# Patient Record
Sex: FEMALE | Race: WHITE | URBAN - METROPOLITAN AREA
[De-identification: names, ages, dates, MRNs, and addresses within clinical notes are randomized per-mention and may not be internally consistent; named-entity substitution may affect disease eponyms.]

---

## 2017-01-24 ENCOUNTER — APPOINTMENT (RX ONLY)
Dept: URBAN - METROPOLITAN AREA CLINIC 52 | Facility: CLINIC | Age: 77
Setting detail: DERMATOLOGY
End: 2017-01-24

## 2017-01-24 DIAGNOSIS — L80 VITILIGO: ICD-10-CM

## 2017-01-24 DIAGNOSIS — D22 MELANOCYTIC NEVI: ICD-10-CM

## 2017-01-24 DIAGNOSIS — L82.1 OTHER SEBORRHEIC KERATOSIS: ICD-10-CM

## 2017-01-24 DIAGNOSIS — B00.1 HERPESVIRAL VESICULAR DERMATITIS: ICD-10-CM | Status: RESOLVED

## 2017-01-24 DIAGNOSIS — D18.0 HEMANGIOMA: ICD-10-CM

## 2017-01-24 DIAGNOSIS — L57.8 OTHER SKIN CHANGES DUE TO CHRONIC EXPOSURE TO NONIONIZING RADIATION: ICD-10-CM

## 2017-01-24 PROBLEM — D22.9 MELANOCYTIC NEVI, UNSPECIFIED: Status: ACTIVE | Noted: 2017-01-24

## 2017-01-24 PROBLEM — D18.01 HEMANGIOMA OF SKIN AND SUBCUTANEOUS TISSUE: Status: ACTIVE | Noted: 2017-01-24

## 2017-01-24 PROCEDURE — 99214 OFFICE O/P EST MOD 30 MIN: CPT

## 2017-01-24 PROCEDURE — ? PRESCRIPTION

## 2017-01-24 PROCEDURE — ? COUNSELING

## 2017-01-24 RX ORDER — TACROLIMUS 1 MG/G
OINTMENT TOPICAL BID
Qty: 1 | Refills: 3 | Status: ERX | COMMUNITY
Start: 2017-01-24

## 2017-01-24 RX ADMIN — TACROLIMUS: 1 OINTMENT TOPICAL at 00:00

## 2017-01-24 ASSESSMENT — LOCATION ZONE DERM: LOCATION ZONE: FACE

## 2017-01-24 ASSESSMENT — LOCATION DETAILED DESCRIPTION DERM: LOCATION DETAILED: RIGHT CHIN

## 2017-01-24 ASSESSMENT — LOCATION SIMPLE DESCRIPTION DERM: LOCATION SIMPLE: CHIN

## 2017-01-24 NOTE — PROCEDURE: MIPS QUALITY
Quality 265: Biopsy Follow-Up: Biopsy results reviewed, communicated, tracked, and documented
Detail Level: Detailed
Quality 131: Pain Assessment And Follow-Up: Pain assessment using a standardized tool is documented as negative, no follow-up plan required
Quality 226: Preventive Care And Screening: Tobacco Use: Screening And Cessation Intervention: Patient screened for tobacco and never smoked
Quality 47: Advance Care Plan: Advance Care Planning discussed and documented in the medical record; patient did not wish or was not able to name a surrogate decision maker or provide an advance care plan.
Quality 110: Preventive Care And Screening: Influenza Immunization: Influenza Immunization previously received during influenza season
Quality 130: Documentation Of Current Medications In The Medical Record: Current Medications Documented
Quality 111:Pneumonia Vaccination Status For Older Adults: Pneumococcal Vaccination Previously Received

## 2017-06-20 ENCOUNTER — APPOINTMENT (RX ONLY)
Dept: URBAN - METROPOLITAN AREA CLINIC 52 | Facility: CLINIC | Age: 77
Setting detail: DERMATOLOGY
End: 2017-06-20

## 2017-06-20 DIAGNOSIS — D22 MELANOCYTIC NEVI: ICD-10-CM

## 2017-06-20 DIAGNOSIS — L57.8 OTHER SKIN CHANGES DUE TO CHRONIC EXPOSURE TO NONIONIZING RADIATION: ICD-10-CM

## 2017-06-20 DIAGNOSIS — L80 VITILIGO: ICD-10-CM | Status: UNCHANGED

## 2017-06-20 DIAGNOSIS — D18.0 HEMANGIOMA: ICD-10-CM

## 2017-06-20 DIAGNOSIS — L82.1 OTHER SEBORRHEIC KERATOSIS: ICD-10-CM

## 2017-06-20 DIAGNOSIS — Z85.828 PERSONAL HISTORY OF OTHER MALIGNANT NEOPLASM OF SKIN: ICD-10-CM

## 2017-06-20 PROBLEM — D22.9 MELANOCYTIC NEVI, UNSPECIFIED: Status: ACTIVE | Noted: 2017-06-20

## 2017-06-20 PROBLEM — D18.01 HEMANGIOMA OF SKIN AND SUBCUTANEOUS TISSUE: Status: ACTIVE | Noted: 2017-06-20

## 2017-06-20 PROCEDURE — ? COUNSELING

## 2017-06-20 PROCEDURE — ? TREATMENT REGIMEN

## 2017-06-20 PROCEDURE — 99214 OFFICE O/P EST MOD 30 MIN: CPT

## 2017-06-20 ASSESSMENT — LOCATION DETAILED DESCRIPTION DERM
LOCATION DETAILED: LEFT SUPERIOR FOREHEAD
LOCATION DETAILED: RIGHT LATERAL DORSAL FOOT

## 2017-06-20 ASSESSMENT — LOCATION ZONE DERM
LOCATION ZONE: FACE
LOCATION ZONE: FEET

## 2017-06-20 ASSESSMENT — LOCATION SIMPLE DESCRIPTION DERM
LOCATION SIMPLE: RIGHT FOOT
LOCATION SIMPLE: LEFT FOREHEAD

## 2017-07-13 ENCOUNTER — APPOINTMENT (RX ONLY)
Dept: URBAN - METROPOLITAN AREA CLINIC 52 | Facility: CLINIC | Age: 77
Setting detail: DERMATOLOGY
End: 2017-07-13

## 2017-07-13 DIAGNOSIS — I78.8 OTHER DISEASES OF CAPILLARIES: ICD-10-CM

## 2017-07-13 DIAGNOSIS — D18.0 HEMANGIOMA: ICD-10-CM

## 2017-07-13 PROBLEM — D18.01 HEMANGIOMA OF SKIN AND SUBCUTANEOUS TISSUE: Status: ACTIVE | Noted: 2017-07-13

## 2017-07-13 PROCEDURE — ? COSMETIC CONSULTATION - RED SPOTS

## 2017-07-13 PROCEDURE — ? MEDICAL CONSULTATION: RED SPOTS

## 2017-07-13 ASSESSMENT — LOCATION DETAILED DESCRIPTION DERM
LOCATION DETAILED: LEFT MEDIAL MALAR CHEEK
LOCATION DETAILED: LEFT LATERAL FOREHEAD
LOCATION DETAILED: LEFT CHIN
LOCATION DETAILED: RIGHT FOREHEAD
LOCATION DETAILED: LEFT UPPER CUTANEOUS LIP
LOCATION DETAILED: RIGHT NASAL ALA

## 2017-07-13 ASSESSMENT — LOCATION SIMPLE DESCRIPTION DERM
LOCATION SIMPLE: RIGHT FOREHEAD
LOCATION SIMPLE: CHIN
LOCATION SIMPLE: LEFT CHEEK
LOCATION SIMPLE: RIGHT NOSE
LOCATION SIMPLE: LEFT FOREHEAD
LOCATION SIMPLE: LEFT LIP

## 2017-07-13 ASSESSMENT — LOCATION ZONE DERM
LOCATION ZONE: NOSE
LOCATION ZONE: LIP
LOCATION ZONE: FACE

## 2017-09-21 NOTE — PATIENT DISCUSSION
(H35.371) Puckering of macula, right eye - Assesment : Examination revealed ERM-TRACE - Plan : Monitor. Advised patient to call our office with decreased vision or increased symptoms.

## 2017-09-21 NOTE — PATIENT DISCUSSION
(D96.820) Keratoconjunct sicca, not specified as Sjogren's, bilateral - Assesment : Examination revealed Dry Eye Syndrome - Plan : Monitor for changes. Advised patient to call our office with decreased vision or increased symptoms.   Artificial tears 3-4 times a day 1 year Exam/Octm

## 2018-07-12 ENCOUNTER — NEW PATIENT COMPREHENSIVE (OUTPATIENT)
Dept: URBAN - METROPOLITAN AREA CLINIC 39 | Facility: CLINIC | Age: 78
End: 2018-07-12

## 2018-07-12 DIAGNOSIS — Z96.1: ICD-10-CM

## 2018-07-12 DIAGNOSIS — H52.03: ICD-10-CM

## 2018-07-12 DIAGNOSIS — H52.4: ICD-10-CM

## 2018-07-12 DIAGNOSIS — H40.012: ICD-10-CM

## 2018-07-12 DIAGNOSIS — H04.123: ICD-10-CM

## 2018-07-12 PROCEDURE — 92015 DETERMINE REFRACTIVE STATE: CPT

## 2018-07-12 PROCEDURE — 92310-2 LEVEL 2 CONTACT LENS MANAGEMENT

## 2018-07-12 PROCEDURE — 92004 COMPRE OPH EXAM NEW PT 1/>: CPT

## 2018-07-12 ASSESSMENT — VISUAL ACUITY
OD_CC: 20/40
OS_BAT: 20/40
OD_BAT: 20/40
OD_CC: J2
OD_SC: 20/50-2
OS_SC: J12
OS_CC: J2
OS_CC: 20/30-2
OS_SC: 20/40
OD_SC: J16

## 2018-07-12 ASSESSMENT — TONOMETRY
OS_IOP_MMHG: 15
OD_IOP_MMHG: 15

## 2018-07-24 ENCOUNTER — EST. PATIENT EMERGENCY (OUTPATIENT)
Dept: URBAN - METROPOLITAN AREA CLINIC 35 | Facility: CLINIC | Age: 78
End: 2018-07-24

## 2018-07-24 DIAGNOSIS — H16.102: ICD-10-CM

## 2018-07-24 DIAGNOSIS — H04.123: ICD-10-CM

## 2018-07-24 PROCEDURE — 92012 INTRM OPH EXAM EST PATIENT: CPT

## 2018-07-24 ASSESSMENT — TONOMETRY
OS_IOP_MMHG: 16
OD_IOP_MMHG: 16

## 2018-07-24 ASSESSMENT — VISUAL ACUITY
OS_SC: 20/40-1+2
OD_SC: 20/50-2+2

## 2019-09-04 NOTE — PATIENT DISCUSSION
(Z98.157) Keratoconjunct sicca, not specified as Sjogren's, bilateral - Assesment : Examination revealed Dry Eye Syndrome - Plan : Monitor for changes. Advised patient to call our office with decreased vision or increased symptoms.   Artificial tears 3-4 times a day    1 YEAR EXAM

## 2019-09-26 ENCOUNTER — ESTABLISHED COMPREHENSIVE EXAM (OUTPATIENT)
Dept: URBAN - METROPOLITAN AREA CLINIC 39 | Facility: CLINIC | Age: 79
End: 2019-09-26

## 2019-09-26 DIAGNOSIS — H40.012: ICD-10-CM

## 2019-09-26 DIAGNOSIS — H01.005: ICD-10-CM

## 2019-09-26 DIAGNOSIS — H52.4: ICD-10-CM

## 2019-09-26 DIAGNOSIS — H01.002: ICD-10-CM

## 2019-09-26 DIAGNOSIS — H04.123: ICD-10-CM

## 2019-09-26 DIAGNOSIS — H52.03: ICD-10-CM

## 2019-09-26 PROCEDURE — 92014 COMPRE OPH EXAM EST PT 1/>: CPT

## 2019-09-26 PROCEDURE — 92015 DETERMINE REFRACTIVE STATE: CPT

## 2019-09-26 ASSESSMENT — VISUAL ACUITY
OS_SC: J12-
OD_SC: 20/50
OS_CC: J1+
OD_CC: 20/25-1
OS_SC: 20/50+2
OD_CC: J1
OD_SC: J14
OS_CC: 20/25

## 2019-09-26 ASSESSMENT — TONOMETRY
OS_IOP_MMHG: 17
OD_IOP_MMHG: 16

## 2020-06-08 NOTE — PATIENT DISCUSSION
Patient noted dryness with some blurred vision which improved with AT use. Patient notes some itching.

## 2020-10-15 ENCOUNTER — EST. PATIENT EMERGENCY (OUTPATIENT)
Dept: URBAN - METROPOLITAN AREA CLINIC 35 | Facility: CLINIC | Age: 80
End: 2020-10-15

## 2020-10-15 DIAGNOSIS — H02.886: ICD-10-CM

## 2020-10-15 DIAGNOSIS — H00.025: ICD-10-CM

## 2020-10-15 DIAGNOSIS — H02.883: ICD-10-CM

## 2020-10-15 PROCEDURE — 92012 INTRM OPH EXAM EST PATIENT: CPT

## 2020-10-15 ASSESSMENT — VISUAL ACUITY
OD_CC: 20/40
OS_CC: 20/30-2

## 2022-02-23 ENCOUNTER — EMERGENCY VISIT (OUTPATIENT)
Dept: URBAN - METROPOLITAN AREA CLINIC 35 | Facility: CLINIC | Age: 82
End: 2022-02-23

## 2022-02-23 DIAGNOSIS — H02.88B: ICD-10-CM

## 2022-02-23 DIAGNOSIS — H00.014: ICD-10-CM

## 2022-02-23 DIAGNOSIS — H02.88A: ICD-10-CM

## 2022-02-23 PROCEDURE — 92012 INTRM OPH EXAM EST PATIENT: CPT

## 2022-02-23 ASSESSMENT — TONOMETRY
OD_IOP_MMHG: 16
OS_IOP_MMHG: 16

## 2022-02-23 ASSESSMENT — VISUAL ACUITY
OS_CC: 20/30-1
OU_CC: 20/25-1
OD_CC: 20/30-2

## 2022-04-14 ENCOUNTER — COMPREHENSIVE EXAM (OUTPATIENT)
Dept: URBAN - METROPOLITAN AREA CLINIC 35 | Facility: CLINIC | Age: 82
End: 2022-04-14

## 2022-04-14 DIAGNOSIS — H02.88B: ICD-10-CM

## 2022-04-14 DIAGNOSIS — H52.03: ICD-10-CM

## 2022-04-14 DIAGNOSIS — H00.014: ICD-10-CM

## 2022-04-14 DIAGNOSIS — H43.813: ICD-10-CM

## 2022-04-14 DIAGNOSIS — H02.88A: ICD-10-CM

## 2022-04-14 PROCEDURE — 92014 COMPRE OPH EXAM EST PT 1/>: CPT

## 2022-04-14 PROCEDURE — 92015 DETERMINE REFRACTIVE STATE: CPT

## 2022-04-14 PROCEDURE — 92250 FUNDUS PHOTOGRAPHY W/I&R: CPT

## 2022-04-14 ASSESSMENT — VISUAL ACUITY
OD_CC: J1
OU_CC: J1
OS_CC: J1
OU_CC: 20/20-1
OS_CC: 20/20-1
OD_CC: 20/25-1

## 2022-04-14 ASSESSMENT — TONOMETRY
OD_IOP_MMHG: 16
OS_IOP_MMHG: 16

## 2023-04-21 ENCOUNTER — COMPREHENSIVE EXAM (OUTPATIENT)
Dept: URBAN - METROPOLITAN AREA CLINIC 35 | Facility: CLINIC | Age: 83
End: 2023-04-21

## 2023-04-21 DIAGNOSIS — H02.88A: ICD-10-CM

## 2023-04-21 DIAGNOSIS — H26.493: ICD-10-CM

## 2023-04-21 DIAGNOSIS — H04.123: ICD-10-CM

## 2023-04-21 DIAGNOSIS — H52.7: ICD-10-CM

## 2023-04-21 DIAGNOSIS — H40.013: ICD-10-CM

## 2023-04-21 DIAGNOSIS — H43.813: ICD-10-CM

## 2023-04-21 DIAGNOSIS — H02.88B: ICD-10-CM

## 2023-04-21 PROCEDURE — 99214 OFFICE O/P EST MOD 30 MIN: CPT

## 2023-04-21 PROCEDURE — 92015 DETERMINE REFRACTIVE STATE: CPT

## 2023-04-21 ASSESSMENT — VISUAL ACUITY
OD_SC: 20/40+1
OD_CC: 20/30-2
OS_SC: 20/40
OS_CC: 20/30-1
OS_BAT: 20/60
OD_CC: J1
OD_BAT: 20/100
OS_CC: J1
OS_SC: J12
OD_SC: J12

## 2023-04-21 ASSESSMENT — TONOMETRY
OS_IOP_MMHG: 16
OD_IOP_MMHG: 16

## 2023-06-21 ENCOUNTER — CONSULTATION/EVALUATION (OUTPATIENT)
Dept: URBAN - METROPOLITAN AREA CLINIC 39 | Facility: CLINIC | Age: 83
End: 2023-06-21

## 2023-06-21 DIAGNOSIS — H01.005: ICD-10-CM

## 2023-06-21 DIAGNOSIS — H26.493: ICD-10-CM

## 2023-06-21 DIAGNOSIS — H04.123: ICD-10-CM

## 2023-06-21 DIAGNOSIS — H40.013: ICD-10-CM

## 2023-06-21 DIAGNOSIS — H01.002: ICD-10-CM

## 2023-06-21 DIAGNOSIS — H02.88A: ICD-10-CM

## 2023-06-21 DIAGNOSIS — H02.88B: ICD-10-CM

## 2023-06-21 DIAGNOSIS — H43.813: ICD-10-CM

## 2023-06-21 PROCEDURE — 92014 COMPRE OPH EXAM EST PT 1/>: CPT

## 2023-06-21 ASSESSMENT — VISUAL ACUITY
OS_BAT: 20/50
OD_CC: 20/30
OS_CC: 20/30
OD_BAT: 20/60

## 2023-06-28 ENCOUNTER — POST-OP (OUTPATIENT)
Dept: URBAN - METROPOLITAN AREA CLINIC 35 | Facility: CLINIC | Age: 83
End: 2023-06-28

## 2023-06-28 DIAGNOSIS — H01.005: ICD-10-CM

## 2023-06-28 DIAGNOSIS — Z98.890: ICD-10-CM

## 2023-06-28 PROCEDURE — 99024 POSTOP FOLLOW-UP VISIT: CPT

## 2023-06-28 RX ORDER — BIMATOPROST 0.3 MG/ML: 1 SOLUTION/ DROPS OPHTHALMIC EVERY EVENING

## 2023-06-28 ASSESSMENT — VISUAL ACUITY
OS_SC: 20/50+1
OS_CC: J1
OS_SC: J12
OD_CC: J1
OD_CC: 20/30-1
OS_CC: 20/25-2
OD_SC: <J12
OD_SC: 20/40

## 2023-06-28 ASSESSMENT — TONOMETRY
OD_IOP_MMHG: 17
OS_IOP_MMHG: 16